# Patient Record
Sex: MALE | URBAN - METROPOLITAN AREA
[De-identification: names, ages, dates, MRNs, and addresses within clinical notes are randomized per-mention and may not be internally consistent; named-entity substitution may affect disease eponyms.]

---

## 2023-04-18 ENCOUNTER — APPOINTMENT (OUTPATIENT)
Dept: URBAN - METROPOLITAN AREA CLINIC 319 | Age: 63
Setting detail: DERMATOLOGY
End: 2023-04-18

## 2023-04-18 PROBLEM — C44.319 BASAL CELL CARCINOMA OF SKIN OF OTHER PARTS OF FACE: Status: ACTIVE | Noted: 2023-04-18

## 2023-04-18 PROCEDURE — 17312 MOHS ADDL STAGE: CPT

## 2023-04-18 PROCEDURE — OTHER MOHS SURGERY: OTHER

## 2023-04-18 PROCEDURE — 17311 MOHS 1 STAGE H/N/HF/G: CPT

## 2023-04-18 PROCEDURE — 14040 TIS TRNFR F/C/C/M/N/A/G/H/F: CPT

## 2023-04-18 NOTE — PROCEDURE: MOHS SURGERY
02/25/22 1149   Plan   Plan Spoke to Rosita, son-in-law, about contacting [facility] when they arrive home with pt and RN will come to the home to assist with tube feeding.  Faxed discharge summary to [facility] via [employer].   Patient/Family in Agreement with Plan yes      O-Z Flap Text: The defect edges were debeveled with a #15 scalpel blade.  Given the location of the defect, shape of the defect and the proximity to free margins an O-Z flap was deemed most appropriate.  Using a sterile surgical marker, an appropriate transposition flap was drawn incorporating the defect and placing the expected incisions within the relaxed skin tension lines where possible. The area thus outlined was incised deep to adipose tissue with a #15 scalpel blade.  The skin margins were undermined to an appropriate distance in all directions utilizing iris scissors.

## 2023-04-18 NOTE — PROCEDURE: MOHS SURGERY
Spoke with pt- she does not have any side effects from the 100 mg gabapentin at this time. She will update office as needed.  Also pt wanted Dr. FIGUEROA to know that she did go to  Dr. Sim office and was seen by a nurse.  Pt was told she could not see Dr. Sim without an updated MRI.  Pt does not want to do another MRI at this time.     Complex Repair And Graft Additional Text (Will Appearing After The Standard Complex Repair Text): The complex repair was not sufficient to completely close the primary defect. The remaining additional defect was repaired with the graft mentioned below.

## 2023-04-18 NOTE — PROCEDURE: MOHS SURGERY
Bedside and Verbal shift change report given to Stephani Angulo (oncoming nurse) by Alfred Osullivan (offgoing nurse). Report included the following information SBAR, Kardex, ED Summary, Intake/Output, MAR, Recent Results and Cardiac Rhythm NSR/ST. Stage 15: Additional Anesthesia Type: 1% lidocaine with epinephrine

## 2023-04-18 NOTE — PROCEDURE: MOHS SURGERY
Hide Additional Notes?: No Detail Level: Simple Debridement Text: The wound edges were debrided prior to proceeding with the closure to facilitate wound healing.

## 2023-04-18 NOTE — PROCEDURE: MOHS SURGERY
Detail Level: Detailed Initiate Treatment: Apply Ketoconazole cream twice daily to affected areas for 4 weeks Bilobed Flap Text: The defect edges were debeveled with a #15 scalpel blade.  Given the location of the defect and the proximity to free margins a bilobe flap was deemed most appropriate.  Using a sterile surgical marker, an appropriate bilobe flap drawn around the defect.    The area thus outlined was incised deep to adipose tissue with a #15 scalpel blade.  The skin margins were undermined to an appropriate distance in all directions utilizing iris scissors.

## 2023-04-27 ENCOUNTER — APPOINTMENT (OUTPATIENT)
Dept: URBAN - METROPOLITAN AREA CLINIC 319 | Age: 63
Setting detail: DERMATOLOGY
End: 2023-04-27

## 2023-04-27 PROBLEM — C44.229 SQUAMOUS CELL CARCINOMA OF SKIN OF LEFT EAR AND EXTERNAL AURICULAR CANAL: Status: ACTIVE | Noted: 2023-04-27

## 2023-04-27 PROCEDURE — OTHER MOHS SURGERY: OTHER

## 2023-04-27 PROCEDURE — 17311 MOHS 1 STAGE H/N/HF/G: CPT | Mod: 79

## 2023-04-27 PROCEDURE — 15260 FTH/GFT FR N/E/E/L 20 SQCM/<: CPT | Mod: 79

## 2023-04-27 NOTE — PROCEDURE: MOHS SURGERY
Discharge instructions reviewed with patient  Patient states understanding  Patient discharged to home       Tony Yates RN  01/14/18 2701 Nostril Rim Text: The closure involved the nostril rim.

## 2023-04-27 NOTE — PROCEDURE: MOHS SURGERY
[>50% of Time Spent on Counseling for ____] : Greater than 50% of the encounter time was spent on counseling for [unfilled] [Time Spent: ___ minutes] : I have spent [unfilled] minutes of face to face time with the patient No Repair - Repaired With Adjacent Surgical Defect Text (Leave Blank If You Do Not Want): After obtaining clear surgical margins the defect was repaired concurrently with another surgical defect which was in close approximation.

## 2023-04-27 NOTE — HPI: SKIN LESION (SQUAMOUS CELL CARCINOMA)
Is This A New Presentation, Or A Follow-Up?: Squamous Cell Carcinoma
[facility] no [facility]. pt reports pain is resolving, and much improved .+ Ambulation, voiding without difficulty, + flatus;   Denies HA, CP, SOB, N/V/D,  no bm; dizziness, palpitations, worsening abdominal pain, worsening vaginal bleeding, or any other concerns. npo    Vital Signs Last 24 Hrs  T(C): 37.4 (09 Sep 2017 22:17), Max: 37.4 (09 Sep 2017 22:17)  T(F): 99.3 (09 Sep 2017 22:17), Max: 99.3 (09 Sep 2017 22:17)  HR: 86 (09 Sep 2017 22:17) (83 - 96)  BP: 116/50 (09 Sep 2017 22:17) (103/49 - 126/57)  BP(mean): --  RR: 16 (09 Sep 2017 22:17) (16 - 17)  SpO2: 100% (09 Sep 2017 22:17) (96% - 100%)    Gen: A&O x 3, NAD  Chest: CTA B/L  Cardiac: S1,S2  RRR  Breast: Soft, nontender, nonengorged  Abdomen: +BS; soft; Nontender, nondistended   Gyn: no vaginal bleeding   Extremities: Nontender, no worsening edema                          8.5    11.8  )-----------( 148      ( 09 Sep 2017 20:02 )             26.8     09-09    143  |  110<H>  |  12  ----------------------------<  98  3.6   |  27  |  0.62    Ca    7.7<L>      09 Sep 2017 05:48        A/P: HD #3  with suspected ovarian cyst rupture, improving  -cont pain management  -follow up cbc 9/10/17  - keep npo  -oob, encourage ambulation  - pt seen and evaluated at bedside by dr. olanescu  -if cbc remains same, possible for discharge tomorrow  armaan
When Was Squamous Cell Carcinoma Biopsied? (Optional): 03/08/23
Accession # (Optional): [id number]

## 2023-06-14 NOTE — PROCEDURE: MOHS SURGERY
[Follow-Up] : a follow-up visit [Sleep Apnea] : sleep apnea [Spouse] : spouse [[id number]] : weight issues, prior Covid infection Consent 3/Introductory Paragraph: I gave the patient a chance to ask questions they had about the procedure.  Following this I explained the Mohs procedure and consent was obtained. The risks, benefits and alternatives to therapy were discussed in detail. Specifically, the risks of infection, scarring, bleeding, prolonged wound healing, incomplete removal, allergy to anesthesia, nerve injury and recurrence were addressed. Prior to the procedure, the treatment site was clearly identified and confirmed by the patient. All components of Universal Protocol/PAUSE Rule completed.

## 2024-11-11 NOTE — PROCEDURE: MOHS SURGERY
Statement Selected Consent (Spinal Accessory)/Introductory Paragraph: The rationale for Mohs was explained to the patient and consent was obtained. The risks, benefits and alternatives to therapy were discussed in detail. Specifically, the risks of damage to the spinal accessory nerve, infection, scarring, bleeding, prolonged wound healing, incomplete removal, allergy to anesthesia, and recurrence were addressed. Prior to the procedure, the treatment site was clearly identified and confirmed by the patient. All components of Universal Protocol/PAUSE Rule completed.